# Patient Record
Sex: FEMALE | Race: WHITE | NOT HISPANIC OR LATINO | Employment: FULL TIME | URBAN - METROPOLITAN AREA
[De-identification: names, ages, dates, MRNs, and addresses within clinical notes are randomized per-mention and may not be internally consistent; named-entity substitution may affect disease eponyms.]

---

## 2023-07-18 PROBLEM — K21.9 LPRD (LARYNGOPHARYNGEAL REFLUX DISEASE): Status: ACTIVE | Noted: 2023-07-18

## 2023-07-18 PROBLEM — M67.431 GANGLION OF RIGHT WRIST: Status: ACTIVE | Noted: 2023-07-18

## 2023-07-18 PROBLEM — R05.9 COUGH: Status: RESOLVED | Noted: 2023-07-18 | Resolved: 2023-07-18

## 2023-07-18 PROBLEM — N39.0 URINARY TRACT INFECTION: Status: RESOLVED | Noted: 2023-07-18 | Resolved: 2023-07-18

## 2023-07-18 PROBLEM — R09.89 THROAT CLEARING: Status: RESOLVED | Noted: 2023-07-18 | Resolved: 2023-07-18

## 2023-07-18 PROBLEM — L52 ERYTHEMA NODOSUM: Status: ACTIVE | Noted: 2023-07-18

## 2023-07-18 PROBLEM — J30.9 ALLERGIC RHINITIS: Status: ACTIVE | Noted: 2023-07-18

## 2023-07-18 RX ORDER — NORGESTIMATE AND ETHINYL ESTRADIOL 0.25-0.035
1 KIT ORAL DAILY
COMMUNITY
Start: 2020-07-28 | End: 2023-07-19

## 2023-07-18 ASSESSMENT — ENCOUNTER SYMPTOMS
VOMITING: 0
COUGH: 0
FATIGUE: 0
MYALGIAS: 0
BACK PAIN: 0
ABDOMINAL PAIN: 0
PALPITATIONS: 0
FREQUENCY: 0
APPETITE CHANGE: 0
SHORTNESS OF BREATH: 0
DYSURIA: 0
UNEXPECTED WEIGHT CHANGE: 0
HEADACHES: 0
CONSTIPATION: 0
BLOOD IN STOOL: 0
DIARRHEA: 0
NERVOUS/ANXIOUS: 0
NAUSEA: 0

## 2023-07-18 NOTE — PROGRESS NOTES
Subjective   Myriam Hubbard is a 33 y.o. female and is here for a comprehensive physical exam. The patient reports no problems.    Establishing care.     Hx alopecia areata, previously receiving injections Needs new referral for Dermatology.    Elevated BP - white coat - at work runs 119/?     History:  LMP: Patient's last menstrual period was 2023 (approximate).  Menopause at NA years  Last pap date: 2021 with GYN  Abnormal pap? no  : 0  Para: 0    Colonoscopy: NA    Do you have pain that bothers you in your daily life? no    Review of Systems   Constitutional:  Negative for appetite change, fatigue and unexpected weight change.   HENT:  Negative for congestion, ear pain and hearing loss.    Eyes:  Negative for visual disturbance.   Respiratory:  Negative for cough and shortness of breath.    Cardiovascular:  Negative for chest pain, palpitations and leg swelling.   Gastrointestinal:  Negative for abdominal pain, blood in stool, constipation, diarrhea, nausea and vomiting.   Genitourinary:  Negative for dysuria, frequency and urgency.   Musculoskeletal:  Positive for arthralgias (plantar fascial symptoms - improving). Negative for back pain and myalgias.   Skin:  Negative for rash.   Neurological:  Negative for syncope and headaches.   Psychiatric/Behavioral:  The patient is not nervous/anxious.         Objective   Physical Exam  Vitals and nursing note reviewed.   Constitutional:       General: She is not in acute distress.     Appearance: Normal appearance. She is not toxic-appearing.   HENT:      Head: Normocephalic.      Right Ear: Tympanic membrane, ear canal and external ear normal.      Left Ear: Tympanic membrane, ear canal and external ear normal.      Nose: Nose normal.      Mouth/Throat:      Mouth: Mucous membranes are dry.      Pharynx: Oropharynx is clear.   Eyes:      Conjunctiva/sclera: Conjunctivae normal.   Neck:      Thyroid: No thyromegaly.   Cardiovascular:      Rate and  Rhythm: Normal rate and regular rhythm.      Pulses: Normal pulses.      Heart sounds: Normal heart sounds.   Pulmonary:      Effort: Pulmonary effort is normal. No respiratory distress.      Breath sounds: Normal breath sounds.   Abdominal:      General: Bowel sounds are normal.      Palpations: Abdomen is soft.      Tenderness: There is no abdominal tenderness.   Musculoskeletal:         General: Normal range of motion.      Cervical back: Neck supple.      Right lower leg: No edema.      Left lower leg: No edema.   Lymphadenopathy:      Cervical: No cervical adenopathy.   Skin:     General: Skin is warm and dry.      Capillary Refill: Capillary refill takes less than 2 seconds.      Findings: No rash.   Neurological:      General: No focal deficit present.      Gait: Gait normal.   Psychiatric:         Mood and Affect: Mood normal.         Judgment: Judgment normal.         Assessment/Plan   Healthy female exam.      1. Check blood work.  Refer to Dermatology for alopecia management.  2. Patient Counseling:  --Nutrition: Stressed importance of moderation in sodium/caffeine intake, saturated fat and cholesterol, caloric balance, sufficient intake of fresh fruits, vegetables, fiber, calcium, iron, and 1 mg of folate supplement per day (for females capable of pregnancy).  --Discussed the issue of estrogen replacement, calcium supplement, and the daily use of baby aspirin.  --Exercise: Stressed the importance of regular exercise.   --Substance Abuse: Discussed cessation/primary prevention of tobacco, alcohol, or other drug use; driving or other dangerous activities under the influence; availability of treatment for abuse.    --Sexuality: Discussed sexually transmitted diseases, partner selection, use of condoms, avoidance of unintended pregnancy  and contraceptive alternatives.   --Injury prevention: Discussed safety belts, safety helmets, smoke detector, smoking near bedding or upholstery.   --Dental health:  Discussed importance of regular tooth brushing, flossing, and dental visits.  --Immunizations reviewed.  --Discussed benefits of screening colonoscopy.  --After hours service discussed with patient  3. Discussed the patient's BMI with her.  The BMI is above average. The patient received Provided instructions on dietary changes  Provided instructions on exercise  Advised to Increase physical activity because they have an above normal BMI.  4. Follow up next physical in 1 year

## 2023-07-19 ENCOUNTER — OFFICE VISIT (OUTPATIENT)
Dept: PRIMARY CARE | Facility: CLINIC | Age: 33
End: 2023-07-19
Payer: COMMERCIAL

## 2023-07-19 VITALS
TEMPERATURE: 97.5 F | SYSTOLIC BLOOD PRESSURE: 152 MMHG | HEART RATE: 93 BPM | HEIGHT: 67 IN | BODY MASS INDEX: 31.14 KG/M2 | DIASTOLIC BLOOD PRESSURE: 86 MMHG | WEIGHT: 198.4 LBS | OXYGEN SATURATION: 98 %

## 2023-07-19 DIAGNOSIS — Z23 ENCOUNTER FOR IMMUNIZATION: ICD-10-CM

## 2023-07-19 DIAGNOSIS — L63.9 ALOPECIA AREATA: Chronic | ICD-10-CM

## 2023-07-19 DIAGNOSIS — Z00.00 ROUTINE GENERAL MEDICAL EXAMINATION AT A HEALTH CARE FACILITY: Primary | ICD-10-CM

## 2023-07-19 LAB
ALANINE AMINOTRANSFERASE (SGPT) (U/L) IN SER/PLAS: 24 U/L (ref 7–45)
ALBUMIN (G/DL) IN SER/PLAS: 4.8 G/DL (ref 3.4–5)
ALKALINE PHOSPHATASE (U/L) IN SER/PLAS: 56 U/L (ref 33–110)
ANION GAP IN SER/PLAS: 14 MMOL/L (ref 10–20)
ASPARTATE AMINOTRANSFERASE (SGOT) (U/L) IN SER/PLAS: 18 U/L (ref 9–39)
BILIRUBIN TOTAL (MG/DL) IN SER/PLAS: 2.1 MG/DL (ref 0–1.2)
CALCIUM (MG/DL) IN SER/PLAS: 10.1 MG/DL (ref 8.6–10.6)
CARBON DIOXIDE, TOTAL (MMOL/L) IN SER/PLAS: 29 MMOL/L (ref 21–32)
CHLORIDE (MMOL/L) IN SER/PLAS: 103 MMOL/L (ref 98–107)
CHOLESTEROL (MG/DL) IN SER/PLAS: 184 MG/DL (ref 0–199)
CHOLESTEROL IN HDL (MG/DL) IN SER/PLAS: 62.4 MG/DL
CHOLESTEROL/HDL RATIO: 2.9
CREATININE (MG/DL) IN SER/PLAS: 0.87 MG/DL (ref 0.5–1.05)
ERYTHROCYTE DISTRIBUTION WIDTH (RATIO) BY AUTOMATED COUNT: 11.9 % (ref 11.5–14.5)
ERYTHROCYTE MEAN CORPUSCULAR HEMOGLOBIN CONCENTRATION (G/DL) BY AUTOMATED: 33.7 G/DL (ref 32–36)
ERYTHROCYTE MEAN CORPUSCULAR VOLUME (FL) BY AUTOMATED COUNT: 87 FL (ref 80–100)
ERYTHROCYTES (10*6/UL) IN BLOOD BY AUTOMATED COUNT: 4.71 X10E12/L (ref 4–5.2)
GFR FEMALE: 90 ML/MIN/1.73M2
GLUCOSE (MG/DL) IN SER/PLAS: 84 MG/DL (ref 74–99)
HEMATOCRIT (%) IN BLOOD BY AUTOMATED COUNT: 41.2 % (ref 36–46)
HEMOGLOBIN (G/DL) IN BLOOD: 13.9 G/DL (ref 12–16)
LDL: 106 MG/DL (ref 0–99)
LEUKOCYTES (10*3/UL) IN BLOOD BY AUTOMATED COUNT: 7.4 X10E9/L (ref 4.4–11.3)
NRBC (PER 100 WBCS) BY AUTOMATED COUNT: 0 /100 WBC (ref 0–0)
PLATELETS (10*3/UL) IN BLOOD AUTOMATED COUNT: 261 X10E9/L (ref 150–450)
POTASSIUM (MMOL/L) IN SER/PLAS: 4.6 MMOL/L (ref 3.5–5.3)
PROTEIN TOTAL: 7.6 G/DL (ref 6.4–8.2)
SODIUM (MMOL/L) IN SER/PLAS: 141 MMOL/L (ref 136–145)
THYROTROPIN (MIU/L) IN SER/PLAS BY DETECTION LIMIT <= 0.05 MIU/L: 2.45 MIU/L (ref 0.44–3.98)
THYROXINE (T4) FREE (NG/DL) IN SER/PLAS: 1.13 NG/DL (ref 0.78–1.48)
TRIGLYCERIDE (MG/DL) IN SER/PLAS: 79 MG/DL (ref 0–149)
UREA NITROGEN (MG/DL) IN SER/PLAS: 13 MG/DL (ref 6–23)
VLDL: 16 MG/DL (ref 0–40)

## 2023-07-19 PROCEDURE — 84439 ASSAY OF FREE THYROXINE: CPT

## 2023-07-19 PROCEDURE — 80061 LIPID PANEL: CPT

## 2023-07-19 PROCEDURE — 1036F TOBACCO NON-USER: CPT | Performed by: NURSE PRACTITIONER

## 2023-07-19 PROCEDURE — 84443 ASSAY THYROID STIM HORMONE: CPT

## 2023-07-19 PROCEDURE — 3008F BODY MASS INDEX DOCD: CPT | Performed by: NURSE PRACTITIONER

## 2023-07-19 PROCEDURE — 99395 PREV VISIT EST AGE 18-39: CPT | Performed by: NURSE PRACTITIONER

## 2023-07-19 PROCEDURE — 90715 TDAP VACCINE 7 YRS/> IM: CPT | Performed by: NURSE PRACTITIONER

## 2023-07-19 PROCEDURE — 80053 COMPREHEN METABOLIC PANEL: CPT

## 2023-07-19 PROCEDURE — 90471 IMMUNIZATION ADMIN: CPT | Performed by: NURSE PRACTITIONER

## 2023-07-19 PROCEDURE — 85027 COMPLETE CBC AUTOMATED: CPT

## 2023-07-19 ASSESSMENT — PATIENT HEALTH QUESTIONNAIRE - PHQ9
SUM OF ALL RESPONSES TO PHQ9 QUESTIONS 1 AND 2: 0
2. FEELING DOWN, DEPRESSED OR HOPELESS: NOT AT ALL
1. LITTLE INTEREST OR PLEASURE IN DOING THINGS: NOT AT ALL

## 2023-07-19 ASSESSMENT — PAIN SCALES - GENERAL: PAINLEVEL: 0-NO PAIN

## 2023-07-20 DIAGNOSIS — R17 ELEVATED BILIRUBIN: Primary | ICD-10-CM

## 2023-07-24 ASSESSMENT — ENCOUNTER SYMPTOMS: ARTHRALGIAS: 1

## 2023-07-24 NOTE — PATIENT INSTRUCTIONS
Blood work ordered.    Elevated blood pressure: Monitor at work. If remains elevated, follow up for treatment.    Tdap updated in office today.    Refer to Dermatology for management of Alopecia.    Follow up in one year and as needed.

## 2023-07-28 DIAGNOSIS — E66.09 CLASS 1 OBESITY DUE TO EXCESS CALORIES WITHOUT SERIOUS COMORBIDITY WITH BODY MASS INDEX (BMI) OF 31.0 TO 31.9 IN ADULT: Primary | ICD-10-CM

## 2023-08-07 ENCOUNTER — LAB (OUTPATIENT)
Dept: LAB | Facility: LAB | Age: 33
End: 2023-08-07
Payer: COMMERCIAL

## 2023-08-07 DIAGNOSIS — E66.09 CLASS 1 OBESITY DUE TO EXCESS CALORIES WITHOUT SERIOUS COMORBIDITY WITH BODY MASS INDEX (BMI) OF 31.0 TO 31.9 IN ADULT: ICD-10-CM

## 2023-08-07 LAB
CALCIDIOL (25 OH VITAMIN D3) (NG/ML) IN SER/PLAS: 19 NG/ML
ESTIMATED AVERAGE GLUCOSE FOR HBA1C: 85 MG/DL
HEMOGLOBIN A1C/HEMOGLOBIN TOTAL IN BLOOD: 4.6 %

## 2023-08-07 PROCEDURE — 36415 COLL VENOUS BLD VENIPUNCTURE: CPT

## 2023-08-07 PROCEDURE — 83036 HEMOGLOBIN GLYCOSYLATED A1C: CPT

## 2023-08-07 PROCEDURE — 82306 VITAMIN D 25 HYDROXY: CPT

## 2023-10-11 PROBLEM — R22.2 MASS ON BACK: Status: ACTIVE | Noted: 2023-10-11

## 2023-10-11 PROBLEM — M25.531 RIGHT WRIST PAIN: Status: ACTIVE | Noted: 2023-10-11

## 2023-10-11 PROBLEM — M25.50 JOINT PAIN: Status: ACTIVE | Noted: 2023-10-11

## 2023-10-11 PROBLEM — R09.1 PLEURISY: Status: ACTIVE | Noted: 2023-10-11

## 2023-10-11 PROBLEM — L63.9 ALOPECIA AREATA, UNSPECIFIED: Status: ACTIVE | Noted: 2023-08-03

## 2023-10-11 PROBLEM — R53.83 FATIGUE: Status: ACTIVE | Noted: 2023-10-11

## 2023-10-11 RX ORDER — FLUOCINOLONE ACETONIDE 0.1 MG/ML
1 SOLUTION TOPICAL
COMMUNITY
Start: 2017-01-17

## 2023-10-11 RX ORDER — CLOBETASOL PROPIONATE 0.46 MG/ML
1 SOLUTION TOPICAL
COMMUNITY
Start: 2017-12-13

## 2023-10-12 ENCOUNTER — OFFICE VISIT (OUTPATIENT)
Dept: DERMATOLOGY | Facility: CLINIC | Age: 33
End: 2023-10-12
Payer: COMMERCIAL

## 2023-10-12 DIAGNOSIS — L63.9 ALOPECIA AREATA: Primary | ICD-10-CM

## 2023-10-12 PROCEDURE — 3008F BODY MASS INDEX DOCD: CPT | Performed by: STUDENT IN AN ORGANIZED HEALTH CARE EDUCATION/TRAINING PROGRAM

## 2023-10-12 PROCEDURE — 1036F TOBACCO NON-USER: CPT | Performed by: STUDENT IN AN ORGANIZED HEALTH CARE EDUCATION/TRAINING PROGRAM

## 2023-10-12 PROCEDURE — 11900 INJECT SKIN LESIONS </W 7: CPT | Performed by: STUDENT IN AN ORGANIZED HEALTH CARE EDUCATION/TRAINING PROGRAM

## 2023-10-12 NOTE — PROGRESS NOTES
Subjective   Myriam Hubbard is a 33 y.o. female who presents for the following: Alopecia (Follow up AA scalp.  Previously had ILK 5mg/mL injected into occipital scalp at last office visit 9/7/23.  Noticing new growth.  No itching, burning or pain in scalp.).  Hair Loss  She is on medications as noted and overall her hair loss has slightly improved. The hair loss is localized in distribution, and has been going on now 1 year. She is currently experiencing no symptoms.      The following portions of the chart were reviewed this encounter and updated as appropriate:         Review of Systems: No other skin or systemic complaints.    Objective   Well appearing patient in no apparent distress; mood and affect are within normal limits.    A focused examination was performed including head, including the scalp, face, neck, nose, ears, eyelids, and lips. All findings within normal limits unless otherwise noted below.    Mid Occipital Scalp  Round, circular, patchy area of nonscarring hair loss. Approx 75% follicles have regrowth.       Assessment/Plan   Alopecia areata  Mid Occipital Scalp    Discussed risks/benefits of ILK including risk of dyspigmentation, atrophy, and that multiple treatments may be needed to see improvement.   RTC 4-6 weeks for re-eval. Perhaps at next visit, depending on response, we can transition to topical regimen for maintenance      Intralesional injection - Mid Occipital Scalp  Intralesional Injection:   Consent:     Consent obtained:  Verbal    Consent given by:  Patient    Risks discussed:  Poor cosmetic result, pain, infection and bleeding    Alternatives discussed:  No treatment  Pre-Procedure Details:     Prep Type:  Isopropyl alcohol  Procedure Details:   Injection:  Triamcinolone  Post-procedure details: sterile dressing applied and wound care instructions given  Dressing type: bandage   Comments:  5mg/mL, 0.3cc total injection    Related Procedures  Follow Up In Dermatology -  Established Patient    Related Medications  triamcinolone acetonide (Kenalog) injection 1.5 mg            Scribe Attestation  By signing my name below, I, Kym Silva LPN , Scribe   attest that this documentation has been prepared under the direction and in the presence of Mukesh Lynn MD.

## 2023-12-05 ENCOUNTER — OFFICE VISIT (OUTPATIENT)
Dept: DERMATOLOGY | Facility: CLINIC | Age: 33
End: 2023-12-05
Payer: COMMERCIAL

## 2023-12-05 DIAGNOSIS — L63.9 ALOPECIA AREATA: ICD-10-CM

## 2023-12-05 DIAGNOSIS — L70.0 ACNE VULGARIS: Primary | ICD-10-CM

## 2023-12-05 PROCEDURE — 3008F BODY MASS INDEX DOCD: CPT | Performed by: STUDENT IN AN ORGANIZED HEALTH CARE EDUCATION/TRAINING PROGRAM

## 2023-12-05 PROCEDURE — 1036F TOBACCO NON-USER: CPT | Performed by: STUDENT IN AN ORGANIZED HEALTH CARE EDUCATION/TRAINING PROGRAM

## 2023-12-05 PROCEDURE — 11900 INJECT SKIN LESIONS </W 7: CPT | Performed by: STUDENT IN AN ORGANIZED HEALTH CARE EDUCATION/TRAINING PROGRAM

## 2023-12-05 PROCEDURE — 99213 OFFICE O/P EST LOW 20 MIN: CPT | Performed by: STUDENT IN AN ORGANIZED HEALTH CARE EDUCATION/TRAINING PROGRAM

## 2023-12-05 RX ORDER — TRETINOIN 0.25 MG/G
CREAM TOPICAL NIGHTLY
Qty: 45 G | Refills: 3 | Status: SHIPPED | OUTPATIENT
Start: 2023-12-05 | End: 2024-03-05 | Stop reason: DRUGHIGH

## 2023-12-05 ASSESSMENT — DERMATOLOGY QUALITY OF LIFE (QOL) ASSESSMENT
DATE THE QUALITY-OF-LIFE ASSESSMENT WAS COMPLETED: 66813
RATE HOW BOTHERED YOU ARE BY SYMPTOMS OF YOUR SKIN PROBLEM (EG, ITCHING, STINGING BURNING, HURTING OR SKIN IRRITATION): 0 - NEVER BOTHERED
RATE HOW BOTHERED YOU ARE BY SYMPTOMS OF YOUR SKIN PROBLEM (EG, ITCHING, STINGING BURNING, HURTING OR SKIN IRRITATION): 0 - NEVER BOTHERED
RATE HOW BOTHERED YOU ARE BY EFFECTS OF YOUR SKIN PROBLEMS ON YOUR ACTIVITIES (EG, GOING OUT, ACCOMPLISHING WHAT YOU WANT, WORK ACTIVITIES OR YOUR RELATIONSHIPS WITH OTHERS): 0 - NEVER BOTHERED
RATE HOW EMOTIONALLY BOTHERED YOU ARE BY YOUR SKIN PROBLEM (FOR EXAMPLE, WORRY, EMBARRASSMENT, FRUSTRATION): 0 - NEVER BOTHERED
RATE HOW BOTHERED YOU ARE BY EFFECTS OF YOUR SKIN PROBLEMS ON YOUR ACTIVITIES (EG, GOING OUT, ACCOMPLISHING WHAT YOU WANT, WORK ACTIVITIES OR YOUR RELATIONSHIPS WITH OTHERS): 0 - NEVER BOTHERED
RATE HOW EMOTIONALLY BOTHERED YOU ARE BY YOUR SKIN PROBLEM (FOR EXAMPLE, WORRY, EMBARRASSMENT, FRUSTRATION): 0 - NEVER BOTHERED
WHAT SINGLE SKIN CONDITION LISTED BELOW IS THE PATIENT ANSWERING THE QUALITY-OF-LIFE ASSESSMENT QUESTIONS ABOUT: ALOPECIA
WHAT SINGLE SKIN CONDITION LISTED BELOW IS THE PATIENT ANSWERING THE QUALITY-OF-LIFE ASSESSMENT QUESTIONS ABOUT: ALOPECIA

## 2023-12-05 ASSESSMENT — PATIENT GLOBAL ASSESSMENT (PGA): WHAT IS THE PGA: PATIENT GLOBAL ASSESSMENT:  2 - MILD

## 2023-12-05 NOTE — PROGRESS NOTES
Subjective   Myriam Hubbard is a 33 y.o. female who presents for the following: Alopecia (LV: 10/12/23: AA.  Doing well.  ILK at LOV with improvement.  No itching, pain or burning in scalp. ).      Hair Loss  She is on medications as noted and overall her hair loss has slightly improved. The hair loss is localized in distribution, and has been going on now 1 year. She is currently experiencing no symptoms.      The following portions of the chart were reviewed this encounter and updated as appropriate:         Review of Systems: No other skin or systemic complaints.    Objective   Well appearing patient in no apparent distress; mood and affect are within normal limits.    A focused examination was performed including head, including the scalp, face, neck, nose, ears, eyelids, and lips. All findings within normal limits unless otherwise noted below.    Mid Occipital Scalp  Round, circular, patchy area of nonscarring hair loss, with short stubble emerging from most ostia.       Assessment/Plan   1. Acne vulgaris    Related Medications  tretinoin (Retin-A) 0.025 % cream  Apply topically once daily at bedtime. Apply pea-sized amount to entire face and posterior scalp at bedtime    2. Alopecia areata  Mid Occipital Scalp  Round, circular, patchy area of nonscarring hair loss, with short stubble emerging from most ostia.     AA, improved s/p ILK x 3, with some ostia that do not yet have regrowth  -offered repeat ILK today  -discussed that given improvement, we can plan to transition to topical therapy for maintenance; discussed options including topical steroid vs retinoid; r/b of each reviewed; she wishes to try tretinoin    She also gets some occasional acne breakouts, so we discussed she can apply medication to her face as well.    Advised to start using tretinoin 0.025% cream every night. Advised to start slowly, applying a pea-sized amount 1-2 times per week, then slowly increasing up to every night as tolerated.  Discussed risk of dryness, irritation with tretinoin. Discussed that it can take months before seeing improvement, and advised that patience and adherance to the recommended regimen is critical to see improvement. Patient verbalized understanding and agreement with plan    Rtc 3 months or earlier prn    Follow Up In Dermatology - Established Patient - Mid Occipital Scalp    tretinoin (Retin-A) 0.025 % cream - Mid Occipital Scalp  Apply topically once daily at bedtime. Apply pea-sized amount to entire face and posterior scalp at bedtime    triamcinolone acetonide (Kenalog) injection 3 mg - Mid Occipital Scalp            Scribe Attestation  By signing my name below, IKym LPN , Scribe   attest that this documentation has been prepared under the direction and in the presence of Mukesh Lynn MD.

## 2024-03-05 ENCOUNTER — OFFICE VISIT (OUTPATIENT)
Dept: DERMATOLOGY | Facility: CLINIC | Age: 34
End: 2024-03-05
Payer: COMMERCIAL

## 2024-03-05 DIAGNOSIS — L63.9 ALOPECIA AREATA: ICD-10-CM

## 2024-03-05 PROCEDURE — 99213 OFFICE O/P EST LOW 20 MIN: CPT | Performed by: STUDENT IN AN ORGANIZED HEALTH CARE EDUCATION/TRAINING PROGRAM

## 2024-03-05 PROCEDURE — 3008F BODY MASS INDEX DOCD: CPT | Performed by: STUDENT IN AN ORGANIZED HEALTH CARE EDUCATION/TRAINING PROGRAM

## 2024-03-05 PROCEDURE — 1036F TOBACCO NON-USER: CPT | Performed by: STUDENT IN AN ORGANIZED HEALTH CARE EDUCATION/TRAINING PROGRAM

## 2024-03-05 RX ORDER — TRETINOIN 0.5 MG/G
CREAM TOPICAL NIGHTLY
Qty: 20 G | Refills: 3 | Status: SHIPPED | OUTPATIENT
Start: 2024-03-05 | End: 2025-03-05

## 2024-03-05 ASSESSMENT — DERMATOLOGY QUALITY OF LIFE (QOL) ASSESSMENT
RATE HOW BOTHERED YOU ARE BY SYMPTOMS OF YOUR SKIN PROBLEM (EG, ITCHING, STINGING BURNING, HURTING OR SKIN IRRITATION): 0 - NEVER BOTHERED
DATE THE QUALITY-OF-LIFE ASSESSMENT WAS COMPLETED: 66904
RATE HOW BOTHERED YOU ARE BY EFFECTS OF YOUR SKIN PROBLEMS ON YOUR ACTIVITIES (EG, GOING OUT, ACCOMPLISHING WHAT YOU WANT, WORK ACTIVITIES OR YOUR RELATIONSHIPS WITH OTHERS): 0 - NEVER BOTHERED
DATE THE QUALITY-OF-LIFE ASSESSMENT WAS COMPLETED: 03/05/2024
WHAT SINGLE SKIN CONDITION LISTED BELOW IS THE PATIENT ANSWERING THE QUALITY-OF-LIFE ASSESSMENT QUESTIONS ABOUT: ALOPECIA
RATE HOW BOTHERED YOU ARE BY EFFECTS OF YOUR SKIN PROBLEMS ON YOUR ACTIVITIES (EG, GOING OUT, ACCOMPLISHING WHAT YOU WANT, WORK ACTIVITIES OR YOUR RELATIONSHIPS WITH OTHERS): 0 - NEVER BOTHERED
WHAT SINGLE SKIN CONDITION LISTED BELOW IS THE PATIENT ANSWERING THE QUALITY-OF-LIFE ASSESSMENT QUESTIONS ABOUT: ALOPECIA
RATE HOW EMOTIONALLY BOTHERED YOU ARE BY YOUR SKIN PROBLEM (FOR EXAMPLE, WORRY, EMBARRASSMENT, FRUSTRATION): 0 - NEVER BOTHERED
RATE HOW EMOTIONALLY BOTHERED YOU ARE BY YOUR SKIN PROBLEM (FOR EXAMPLE, WORRY, EMBARRASSMENT, FRUSTRATION): 0 - NEVER BOTHERED
RATE HOW BOTHERED YOU ARE BY SYMPTOMS OF YOUR SKIN PROBLEM (EG, ITCHING, STINGING BURNING, HURTING OR SKIN IRRITATION): 0 - NEVER BOTHERED

## 2024-03-05 ASSESSMENT — PATIENT GLOBAL ASSESSMENT (PGA): WHAT IS THE PGA: PATIENT GLOBAL ASSESSMENT:  2 - MILD

## 2024-03-05 NOTE — PROGRESS NOTES
Subjective   Myriam Hubbard is a 33 y.o. female who presents for the following: Alopecia (Follow up alopecia areata to mid occipital scalp.  Pt notes improvement.  Currently using Tretinoin 0.025% cream once nightly. Previously treated with ILK x 3, Candida injection, topical steroids/Patient states she did have a new spot of hair loss on back of scalp near lower hairline.)    The following portions of the chart were reviewed this encounter and updated as appropriate:         Review of Systems: No other skin or systemic complaints.    Objective   Well appearing patient in no apparent distress; mood and affect are within normal limits.    A focused examination was performed including head, including the scalp, face, neck, nose, ears, eyelids, and lips. All findings within normal limits unless otherwise noted below.    Scalp  Round, circular, patchy area of nonscarring hair loss - stubble noted throughout        Assessment/Plan   1. Alopecia areata  Scalp  Round, circular, patchy area of nonscarring hair loss - stubble noted throughout    There is good hair regrowth noted  Discussed options including continuing tretinoin 0.025% cream nightly vs trial of higher strength if not too irritating, for possibly improved results. She voiced desire to try higher strength.   Rx sent for tretinoin 0.05% cream nightly to affected area  Rtc 6 months or earlier prn    Related Procedures  Follow Up In Dermatology - Established Patient    Related Medications  tretinoin (Retin-A) 0.05 % cream  Apply topically once daily at bedtime. Apply to the affected area on the scalp until resolved

## 2024-09-10 ENCOUNTER — APPOINTMENT (OUTPATIENT)
Dept: DERMATOLOGY | Facility: CLINIC | Age: 34
End: 2024-09-10
Payer: COMMERCIAL

## 2024-09-10 DIAGNOSIS — L63.9 ALOPECIA AREATA: ICD-10-CM

## 2024-09-26 DIAGNOSIS — R11.0 NAUSEA: Primary | ICD-10-CM

## 2024-09-26 RX ORDER — ONDANSETRON 4 MG/1
8 TABLET, FILM COATED ORAL EVERY 8 HOURS PRN
Qty: 30 TABLET | Refills: 1 | Status: SHIPPED | OUTPATIENT
Start: 2024-09-26

## 2024-09-30 ASSESSMENT — PATIENT GLOBAL ASSESSMENT (PGA): WHAT IS THE PGA: PATIENT GLOBAL ASSESSMENT:  2 - MILD

## 2024-09-30 ASSESSMENT — DERMATOLOGY QUALITY OF LIFE (QOL) ASSESSMENT
RATE HOW BOTHERED YOU ARE BY EFFECTS OF YOUR SKIN PROBLEMS ON YOUR ACTIVITIES (EG, GOING OUT, ACCOMPLISHING WHAT YOU WANT, WORK ACTIVITIES OR YOUR RELATIONSHIPS WITH OTHERS): 0 - NEVER BOTHERED
RATE HOW BOTHERED YOU ARE BY SYMPTOMS OF YOUR SKIN PROBLEM (EG, ITCHING, STINGING BURNING, HURTING OR SKIN IRRITATION): 0 - NEVER BOTHERED
DATE THE QUALITY-OF-LIFE ASSESSMENT WAS COMPLETED: 67113
RATE HOW EMOTIONALLY BOTHERED YOU ARE BY YOUR SKIN PROBLEM (FOR EXAMPLE, WORRY, EMBARRASSMENT, FRUSTRATION): 0 - NEVER BOTHERED
RATE HOW EMOTIONALLY BOTHERED YOU ARE BY YOUR SKIN PROBLEM (FOR EXAMPLE, WORRY, EMBARRASSMENT, FRUSTRATION): 0 - NEVER BOTHERED
RATE HOW BOTHERED YOU ARE BY EFFECTS OF YOUR SKIN PROBLEMS ON YOUR ACTIVITIES (EG, GOING OUT, ACCOMPLISHING WHAT YOU WANT, WORK ACTIVITIES OR YOUR RELATIONSHIPS WITH OTHERS): 0 - NEVER BOTHERED
WHAT SINGLE SKIN CONDITION LISTED BELOW IS THE PATIENT ANSWERING THE QUALITY-OF-LIFE ASSESSMENT QUESTIONS ABOUT: ALOPECIA
RATE HOW BOTHERED YOU ARE BY SYMPTOMS OF YOUR SKIN PROBLEM (EG, ITCHING, STINGING BURNING, HURTING OR SKIN IRRITATION): 0 - NEVER BOTHERED
WHAT SINGLE SKIN CONDITION LISTED BELOW IS THE PATIENT ANSWERING THE QUALITY-OF-LIFE ASSESSMENT QUESTIONS ABOUT: ALOPECIA

## 2024-10-01 ENCOUNTER — APPOINTMENT (OUTPATIENT)
Dept: DERMATOLOGY | Facility: CLINIC | Age: 34
End: 2024-10-01
Payer: COMMERCIAL

## 2024-10-01 DIAGNOSIS — L63.9 ALOPECIA AREATA: ICD-10-CM

## 2024-10-01 PROCEDURE — 99213 OFFICE O/P EST LOW 20 MIN: CPT | Performed by: STUDENT IN AN ORGANIZED HEALTH CARE EDUCATION/TRAINING PROGRAM

## 2024-10-01 NOTE — PROGRESS NOTES
Subjective     Myriam Hubbard is a 34 y.o. female who presents for the following: Alopecia (LV 3/5/24. Rx Tretinoin cream 0.05%. Patient has noticed an improvement in condition with no new areas of hair loss.).     Review of Systems:  No other skin or systemic complaints other than what is documented elsewhere in the note.    The following portions of the chart were reviewed this encounter and updated as appropriate:          Skin Cancer History  No skin cancer on file.      Specialty Problems          Dermatology Problems    Erythema nodosum    Alopecia areata, unspecified        Objective   Well appearing patient in no apparent distress; mood and affect are within normal limits.    A focused skin examination was performed. All findings within normal limits unless otherwise noted below.    Assessment/Plan   1. Alopecia areata  Scalp  Round, circular, patchy area of nonscarring hair loss - short hairs noted throughout    There is continued good hair regrowth noted  Continue tretinoin 0.05% cream every other night to affected area  Rtc 12 months or earlier prn    She is getting  this Friday! She is happy to have good results before then.     Related Procedures  Follow Up In Dermatology - Established Patient    Related Medications  tretinoin (Retin-A) 0.05 % cream  Apply topically once daily at bedtime. Apply to the affected area on the scalp until resolved

## 2024-11-16 ENCOUNTER — OFFICE VISIT (OUTPATIENT)
Dept: URGENT CARE | Age: 34
End: 2024-11-16
Payer: COMMERCIAL

## 2024-11-16 VITALS
OXYGEN SATURATION: 100 % | HEART RATE: 84 BPM | TEMPERATURE: 98.2 F | RESPIRATION RATE: 18 BRPM | SYSTOLIC BLOOD PRESSURE: 151 MMHG | DIASTOLIC BLOOD PRESSURE: 95 MMHG

## 2024-11-16 DIAGNOSIS — S61.011A LACERATION OF RIGHT THUMB WITHOUT FOREIGN BODY WITHOUT DAMAGE TO NAIL, INITIAL ENCOUNTER: Primary | ICD-10-CM

## 2024-11-16 ASSESSMENT — ENCOUNTER SYMPTOMS
MYALGIAS: 0
WOUND: 1
JOINT SWELLING: 0
ARTHRALGIAS: 0

## 2024-11-16 ASSESSMENT — PAIN SCALES - GENERAL: PAINLEVEL_OUTOF10: 2

## 2024-11-17 NOTE — PROGRESS NOTES
Subjective   Patient ID: Myriam Hubbard is a 34 y.o. female. They present today with a chief complaint of Finger Laceration (Tonight x R thumb lac).    History of Present Illness  33 YO F CUT R THUMB ON KNIFE AFTER CUTTING BREAD PTA          Past Medical History  Allergies as of 11/16/2024    (No Known Allergies)       (Not in a hospital admission)       Past Medical History:   Diagnosis Date    Eczema 2005    Encounter for screening for malignant neoplasm of vagina     Vaginal Pap smear    Personal history of other diseases of the nervous system and sense organs     History of migraine    Personal history of other specified conditions     History of jaundice    Snoring     Snoring    Throat clearing 07/18/2023    Urinary tract infection 07/18/2023       Past Surgical History:   Procedure Laterality Date    MANDIBLE SURGERY  01/31/2014    Jaw Surgery    MOUTH SURGERY  01/31/2014    Oral Surgery Tooth Extraction    OTHER SURGICAL HISTORY  01/31/2014    Mandibular Bone Graft    OTHER SURGICAL HISTORY  02/19/2014    Surgery        reports that she has never smoked. She has never used smokeless tobacco. She reports current alcohol use of about 4.0 standard drinks of alcohol per week. She reports that she does not currently use drugs after having used the following drugs: Marijuana.    Review of Systems  Review of Systems   Musculoskeletal:  Negative for arthralgias, joint swelling and myalgias.   Skin:  Positive for wound.                                  Objective    Vitals:    11/16/24 1954   BP: (!) 151/95   Pulse: 84   Resp: 18   Temp: 36.8 °C (98.2 °F)   SpO2: 100%     No LMP recorded.    Physical Exam  Vitals and nursing note reviewed.   Constitutional:       Appearance: Normal appearance.   Musculoskeletal:         General: Signs of injury present.   Skin:     Findings: Laceration present.      Comments: R THUMB LACERATION AT THE ANTERIOR PIP + BLEEDING    Neurological:      Mental Status: She is alert and  oriented to person, place, and time.         Laceration Repair    Date/Time: 11/16/2024 8:33 PM    Performed by: Rachael Jalloh PA-C  Authorized by: Rachael Jalloh PA-C    Consent:     Consent obtained:  Verbal    Consent given by:  Patient    Risks, benefits, and alternatives were discussed: yes      Risks discussed:  Infection and pain  Universal protocol:     Patient identity confirmed:  Verbally with patient  Anesthesia:     Anesthesia method:  Nerve block    Block location:  DISTAL R THUMB    Block needle gauge:  25 G    Block anesthetic:  Lidocaine 1% w/o epi    Block injection procedure:  Anatomic landmarks identified, introduced needle, incremental injection and negative aspiration for blood    Block outcome:  Anesthesia achieved  Laceration details:     Location:  Finger    Finger location:  R thumb    Length (cm):  2  Pre-procedure details:     Preparation:  Patient was prepped and draped in usual sterile fashion  Exploration:     Contaminated: no    Treatment:     Area cleansed with:  Povidone-iodine    Amount of cleaning:  Standard    Irrigation solution:  Sterile saline  Skin repair:     Repair method:  Sutures    Suture size:  4-0    Suture material:  Nylon    Suture technique:  Simple interrupted    Number of sutures:  5  Approximation:     Approximation:  Loose  Repair type:     Repair type:  Simple  Post-procedure details:     Dressing:  Bulky dressing and splint for protection    Procedure completion:  Tolerated      Point of Care Test & Imaging Results from this visit  No results found for this visit on 11/16/24.   No results found.    Diagnostic study results (if any) were reviewed by Rachael Jalloh PA-C.    Assessment/Plan   Allergies, medications, history, and pertinent labs/EKGs/Imaging reviewed by Rachael Jalloh PA-C.     Medical Decision Making      Orders and Diagnoses  There are no diagnoses linked to this encounter.    Medical Admin Record      Patient disposition:  Home    Electronically signed by Rachael Jalloh PA-C  8:31 PM

## 2025-03-17 DIAGNOSIS — Z31.41 FERTILITY TESTING: Primary | ICD-10-CM

## 2025-03-20 ENCOUNTER — APPOINTMENT (OUTPATIENT)
Dept: DERMATOLOGY | Facility: CLINIC | Age: 35
End: 2025-03-20
Payer: COMMERCIAL

## 2025-03-21 LAB — MIS SERPL-MCNC: 1.76 NG/ML (ref 0.36–10.07)

## 2025-04-23 ENCOUNTER — OFFICE VISIT (OUTPATIENT)
Dept: OBSTETRICS AND GYNECOLOGY | Facility: CLINIC | Age: 35
End: 2025-04-23
Payer: COMMERCIAL

## 2025-04-23 VITALS
BODY MASS INDEX: 23.64 KG/M2 | HEIGHT: 68 IN | SYSTOLIC BLOOD PRESSURE: 124 MMHG | HEART RATE: 87 BPM | WEIGHT: 156 LBS | DIASTOLIC BLOOD PRESSURE: 86 MMHG

## 2025-04-23 DIAGNOSIS — Z01.419 WELL WOMAN EXAM WITH ROUTINE GYNECOLOGICAL EXAM: Primary | ICD-10-CM

## 2025-04-23 PROCEDURE — 1036F TOBACCO NON-USER: CPT | Performed by: OBSTETRICS & GYNECOLOGY

## 2025-04-23 PROCEDURE — 99395 PREV VISIT EST AGE 18-39: CPT | Performed by: OBSTETRICS & GYNECOLOGY

## 2025-04-23 PROCEDURE — 3008F BODY MASS INDEX DOCD: CPT | Performed by: OBSTETRICS & GYNECOLOGY

## 2025-04-23 ASSESSMENT — COLUMBIA-SUICIDE SEVERITY RATING SCALE - C-SSRS
2. HAVE YOU ACTUALLY HAD ANY THOUGHTS OF KILLING YOURSELF?: NO
6. HAVE YOU EVER DONE ANYTHING, STARTED TO DO ANYTHING, OR PREPARED TO DO ANYTHING TO END YOUR LIFE?: NO
1. IN THE PAST MONTH, HAVE YOU WISHED YOU WERE DEAD OR WISHED YOU COULD GO TO SLEEP AND NOT WAKE UP?: NO

## 2025-04-23 ASSESSMENT — PATIENT HEALTH QUESTIONNAIRE - PHQ9
2. FEELING DOWN, DEPRESSED OR HOPELESS: NOT AT ALL
SUM OF ALL RESPONSES TO PHQ9 QUESTIONS 1 AND 2: 0
1. LITTLE INTEREST OR PLEASURE IN DOING THINGS: NOT AT ALL

## 2025-04-23 ASSESSMENT — PAIN SCALES - GENERAL: PAINLEVEL_OUTOF10: 0-NO PAIN

## 2025-04-23 NOTE — PROGRESS NOTES
"Assessment   Myriam was seen today for annual exam.  Diagnoses and all orders for this visit:  Well woman exam with routine gynecological exam (Primary)    FU 1 year with Dr. Chester Flores MD    Subjective   35 YO who presents for an annual gynecological exam. PCP = Ruchi HUANG Concerns: none     Other Concerns: May try for pregnancy later this year once Rafas SA normalizes. Has been doing OPKs, ovulating monthly. Her past month was irregular bc she went on vacation and it was stressful for her. She already checked her AMH and it was normal. Their families live close by so they can have support.     OBHx: G0, had negative carrier screening (partner's fam with dysautonomia and Alexander Sachs).     GynHx:  Menstrual Hx: Menarche 11, q26-28 days, last 3-5 days, 1-2 heavy days (4 tampons). No IMS. Mod pain. + Mood swings.  STIs: denies h/o  Contraception: condoms  Sexual History/Complaints: Men, monogamous, no complaints     PapHx:  2014 - neg cytology  Nov 2021 - neg cotest     Preventative:  Last mammogram: due age 39  Last Colonoscopy: due age 44  DM Screening: A1C 4.6% in 2023  DEXA: due 10 years from menopause  Gardasil: received in teens  Exercise: Peloton  Diet: no restrictions  Seat Belt Use: always     SoHx:  Living Situation: lives with  Cain Wang   School/Employment: embyrologist at   Substance: no T/D, social EtOH (5 per week)  CAGE: neg  Abuse: denies  Depression Screen: negative     PMH:  Laryngopharyngeal reflux  White coat HTN  Alopecia - not on meds now    PSH:  Nov 2021 - lipoma removal (back)  2010 - bone implant, jaw surgery     FamHx:  No fam hx of breast, ovarian, uterine cancer.  Father - colon CA (dx age 63)  Pat GM - breast CA (dx in her 50s)     Objective   /86   Pulse 87   Ht 1.727 m (5' 8\")   Wt 70.8 kg (156 lb)   LMP 03/30/2025 (Exact Date)   BMI 23.72 kg/m²      General:   Alert and oriented, in no acute distress   Skin: No significant acne. No " hirsutism.   Neck: Supple. No visible thyromegaly.    Breast/Axilla: Normal to palpation bilaterally without masses, skin changes, or nipple discharge.    Abdomen: Soft, non-tender, without masses or organomegaly   Vulva: Normal architecture without erythema, masses, or lesions.    Vagina: Normal mucosa without lesions, masses, or atrophy. No abnormal vaginal discharge.    Cervix: Normal without masses, lesions, or signs of cervicitis.    Uterus: Normal mobile, non-enlarged uterus    Adnexa: Normal without masses or lesions   Pelvic Floor No POP noted. No high tone pelvic floor    Psych Normal affect. Normal mood.

## 2025-07-18 ENCOUNTER — OFFICE VISIT (OUTPATIENT)
Dept: ORTHOPEDIC SURGERY | Facility: HOSPITAL | Age: 35
End: 2025-07-18
Payer: COMMERCIAL

## 2025-07-18 ENCOUNTER — HOSPITAL ENCOUNTER (OUTPATIENT)
Dept: RADIOLOGY | Facility: HOSPITAL | Age: 35
Discharge: HOME | End: 2025-07-18
Payer: COMMERCIAL

## 2025-07-18 VITALS — WEIGHT: 152 LBS | HEIGHT: 68 IN | BODY MASS INDEX: 23.04 KG/M2

## 2025-07-18 DIAGNOSIS — M79.641 RIGHT HAND PAIN: ICD-10-CM

## 2025-07-18 DIAGNOSIS — M77.8 FLEXOR TENDINITIS OF HAND: Primary | ICD-10-CM

## 2025-07-18 PROCEDURE — 99203 OFFICE O/P NEW LOW 30 MIN: CPT

## 2025-07-18 PROCEDURE — 3008F BODY MASS INDEX DOCD: CPT

## 2025-07-18 PROCEDURE — 99213 OFFICE O/P EST LOW 20 MIN: CPT

## 2025-07-18 PROCEDURE — 73130 X-RAY EXAM OF HAND: CPT | Mod: RT

## 2025-07-18 ASSESSMENT — PAIN - FUNCTIONAL ASSESSMENT: PAIN_FUNCTIONAL_ASSESSMENT: 0-10

## 2025-07-18 ASSESSMENT — PAIN SCALES - GENERAL: PAINLEVEL_OUTOF10: 6

## 2025-08-04 NOTE — PROGRESS NOTES
HPI:  Myriam Hubbard is a 35 y.o. year-old female who presents to the orthopedic injury clinic with a 1 week history of right hand pain.  No specific mechanism of injury.  Localized around the fourth finger.  Worse in the morning.  Gets better throughout the day.  Denies any trauma, swelling, ecchymosis.  Can open and close hand without any popping, clicking, snapping.  No other questions or concerns at time of visit.    ROS:  Reviewed on EMR and patient intake sheet.    PMH/SH:  Reviewed on EMR and patient intake sheet.    Exam:  MSK: No obvious bony deformity, swelling, ecchymosis of right hand and visual examination.  Able to open and close hand fully without pain.  Mild tenderness to palpation of the volar aspect of the fourth MCP.  Otherwise, unremarkable exam.  General: No acute distress. Awake and conversant.  Eyes: Normal conjunctiva, anicteric. Round symmetric pupils.  ENT: Hearing grossly intact. No nasal discharge.  Neck: Neck is supple. No masses or thyromegaly.  Respiratory: Respirations are non-labored. No wheezing.  Skin: Warm. No rashes or ulcers.  Psych: Alert and oriented. Cooperative, appropriate mood and affect, normal judgement.  CV: No lower extremity edema.  Neuro: Sensation and CN II-XII grossly normal.    Radiology:     Xrays of right hand personally reviewed and demonstrate no acute fractures or dislocations.  Alignment within normal limits.    Diagnosis:    Flexor tendinitis, right hand    Assessment and Plan:  Patient was seen and evaluated for random onset right hand pain which has been worsening.  At this time, she may have some flexor tenosynovitis.  I recommended rest, anti-inflammatories, and a finger splint/brace to wear at work/at night.  She should follow-up with a hand/wrist provider if her symptoms continue to worsen.  She may benefit from injection.  Patient feels all questions were answered today.  Patient agrees to the plan above.    **This note was dictated using speech  recognition software and was not corrected for spelling or grammatical errors**    Cain Laguerre PA-C  Department of Orthopaedic Surgery  3:30 PM  08/04/25    [address] [place] [address]    Voicemail: [phone]   Appts: [phone]  Fax: [phone]

## 2025-09-16 ENCOUNTER — APPOINTMENT (OUTPATIENT)
Dept: PRIMARY CARE | Facility: CLINIC | Age: 35
End: 2025-09-16
Payer: COMMERCIAL

## 2025-10-09 ENCOUNTER — APPOINTMENT (OUTPATIENT)
Dept: DERMATOLOGY | Facility: CLINIC | Age: 35
End: 2025-10-09
Payer: COMMERCIAL

## 2025-12-02 ENCOUNTER — APPOINTMENT (OUTPATIENT)
Dept: DERMATOLOGY | Facility: CLINIC | Age: 35
End: 2025-12-02
Payer: COMMERCIAL